# Patient Record
Sex: FEMALE | Race: WHITE
[De-identification: names, ages, dates, MRNs, and addresses within clinical notes are randomized per-mention and may not be internally consistent; named-entity substitution may affect disease eponyms.]

---

## 2017-06-06 ENCOUNTER — HOSPITAL ENCOUNTER (EMERGENCY)
Dept: HOSPITAL 68 - ERH | Age: 37
End: 2017-06-06
Payer: COMMERCIAL

## 2017-06-06 VITALS — WEIGHT: 110 LBS | HEIGHT: 65 IN | BODY MASS INDEX: 18.33 KG/M2

## 2017-06-06 VITALS — SYSTOLIC BLOOD PRESSURE: 98 MMHG | DIASTOLIC BLOOD PRESSURE: 60 MMHG

## 2017-06-06 DIAGNOSIS — F13.239: Primary | ICD-10-CM

## 2017-06-06 NOTE — ED GENERAL ADULT
History of Present Illness
 
General
Chief Complaint: General Adult
Stated Complaint: MED REFILLS, ANXIETY
Source: patient
Exam Limitations: no limitations
 
Vital Signs & Intake/Output
Vital Signs & Intake/Output
 Vital Signs
 
 
Date Time Temp Pulse Resp B/P B/P Pulse O2 O2 Flow FiO2
 
     Mean Ox Delivery Rate 
 
06/06 0725 97.8 78 16 98/60  97   
 
06/06 0722      97   
 
 
Allergies
Coded Allergies:
Sulfa (Sulfonamide Antibiotics) (ANAPHYLAXIS 12/05/16)
venom-honey bee (ANAPHYLAXIS 12/05/16)
 
Reconcile Medications
Alprazolam (Xanax) 0.5 MG TABLET   1 TAB PO BIDP PRN ANXIETY
Alprazolam (Xanax) 0.5 MG TABLET   1 TAB PO BIDP PRN ANXIETY
Beta-Carotene (Beta Carotene) (Unknown Strength) CAPSULE   (Unknown Dose) PO 
DAILY SUPPLEMENT  (Reported)
Cyclobenzaprine HCl 10 MG TABLET   1 TAB PO TID PRN MUSCLE RELAXANT
     MAY CAUSE DROWSINESS
Diazepam 2 MG TABLET   1-2 TAB PO Q6 PRN muscle relaxant
Epinephrine (Epipen 2-Gilberto) 0.3 MG/0.3 ML AUTO.INJCT   0.3 MG IM AD PRN ALLERGIC 
REACTION  (Reported)
Meloxicam (Mobic) 15 MG TABLET   1 TAB PO DAILY PRN PAIN/INFLAMMATION
Tramadol HCl 50 MG TABLET   1-2 TAB PO Q6 PRN pain
 
Triage Note:
PT STATES SHE TOOK HERSELF OFF METHADONE BECAUSE
 SHE WAS ON IT FOR A FEW YEARS.  PT ALSO TAKES
 XANAX QHS TO HELP HER SLEEP STATES THAT SHE HAS
 BEEN TAKING MORE TO HELP HER THROUGH THE
 WITHDRAWELS FROM THE METHADONE AND RUN OUT.  PT
 WENT TO SEE DR. HEAD AND HE WAS UNABLE TO GET
 HER MEDS TO GO THROUGH AT THE PHARMACY BECAUSE IT
 IS TO EARLY FOR HER TO FILL.  PT STATES SHE IS
 ABLE TO P/UP HER PRESCRIPTION ON THURSDAY AND JUST
 NEEDS A FEW PILLS TO HELP HER THROUGH.  PT REPORTS
 THAT SHE IS HAVING DIARRHEA AND NAUSEA AT THIS
 TIME HAS NOT HAD XANAX IN 5 DAYS.
Triage Nurses Notes Reviewed? yes
Pregnant: No
Patient currently breastfeeds: No
HPI:
This is a 36 year old female who recently came off methadone with an presents 
with shakiness, nausea and diarrhea.  her last ast dose of xanax was 5 days ago.
 She is due for refill on thursday.  She has been taking occasional extra doses 
secondary to anxiety because she took herself off of methadone.  She discussed 
it with her primary care doctor who states he could refill the medication 
because her new prescription is due on the 11th.  She has not had Xanax in 5 
days and is starting to feel shaky.  History of seizure many years ago when she 
was on 2 mg of Xanax and attempted to discontinue.  No SI or HI.  She reports 
wanting to eventually get off of Xanax as well.  The patient is 7 months 
postpartum.
 
Past History
 
Travel History
Traveled to [place] past 21 day No
 
Medical History
Any Pertinent Medical History? see below for history
Psychiatric: anxiety, HISTORY OF OPIATE DEPENDENCE
Other Medical Hx:
left shoulder surgery
 
Surgical History
Surgical History: left shoulder surgery
 
Psychosocial History
What is your primary language English
Tobacco Use: Current Daily Use
Daily Tobacco Use Amount/Type: => 5 Cigarettes daily
ETOH Use: occasional use
Illicit Drug Use: denies illicit drug use
 
Family History
Hx Contributory? No
 
Review of Systems
 
Review of Systems
Constitutional:
Denies: chills, fever. 
Respiratory:
Denies: cough, short of breath, sputum production. 
Cardiovascular:
Denies: chest pain, palpitations. 
Neurological/Psychological:
Reports: anxiety.  Denies: confusion, depressed, emotional problems. 
Hematologic/Endocrine:
Denies: bruising, bleeding. 
Immunologic/Allergic:
Denies: splenectomy. 
 
Physical Exam
 
Physical Exam
General Appearance: alert, awake, thin
Head: atraumatic, normal appearance
Eyes:
Bilateral: PERRL, EOMI. 
Ears, Nose, Throat: normal pharynx, hearing grossly normal
Neck: normal inspection, supple, full range of motion
Respiratory: normal breath sounds, chest non-tender, no respiratory distress
Neurologic/Psych: no motor/sensory deficits, awake, alert, oriented x 3
Skin: intact, normal color, warm/dry
 
Core Measures
ACS in differential dx? No
CVA/TIA Diagnosis: No
Severe Sepsis Present: No
Septic Shock Present: No
 
Progress
Differential Diagnoses
I considered the following diagnoses in my evaluation of the patient: [Xanax 
dependence, Xanax withdrawal]
 
Plan of Care:
Patient is well kempt, awake alert and oriented.  Requesting a prescription for 
a few pills of Xanax to get her through Thursday.  Last dose was on Friday.
Initial ED EKG: none
 
Departure
 
Departure
Time of Disposition: 0734
Disposition: HOME OR SELF CARE
Condition: Stable
Clinical Impression
Primary Impression: Benzodiazepine withdrawal
Referrals:
PATIENT HAS NO PRIMARY CARE DR (PCP/Family)
 
Additional Instructions:
TAKE THE MEDICATIONS AS DIRECTED.  FOLLOW UP WITH YOUR PSYCHIATRIST FOR YOUR 
REGULAR PRESCRIPTIONS.
Departure Forms:
Customer Survey
General Discharge Information
Prescriptions:
Current Visit Scripts
Alprazolam (Xanax) 1 TAB PO BIDP PRN ANXIETY 
     #5 TAB 
 
Alprazolam (Xanax) 1 TAB PO BIDP PRN ANXIETY 
     #5 TAB 
 
 
 
Critical Care Note
 
Critical Care Note
Critical Care Time: non-applicable

## 2017-06-07 ENCOUNTER — HOSPITAL ENCOUNTER (EMERGENCY)
Dept: HOSPITAL 68 - ERH | Age: 37
End: 2017-06-07
Payer: COMMERCIAL

## 2017-06-07 VITALS — HEIGHT: 63 IN | BODY MASS INDEX: 20.55 KG/M2 | WEIGHT: 116 LBS

## 2017-06-07 VITALS — SYSTOLIC BLOOD PRESSURE: 90 MMHG | DIASTOLIC BLOOD PRESSURE: 60 MMHG

## 2017-06-07 DIAGNOSIS — F13.20: Primary | ICD-10-CM

## 2017-06-07 NOTE — ED GENERAL ADULT
History of Present Illness
 
General
Chief Complaint: ETOH/Drug Related Complaint
Stated Complaint: BENZO WITHDRAWAL
Source: patient, old records
Exam Limitations: no limitations
 
Vital Signs & Intake/Output
Vital Signs & Intake/Output
 Vital Signs
 
 
Date Time Temp Pulse Resp B/P B/P Pulse O2 O2 Flow FiO2
 
     Mean Ox Delivery Rate 
 
06/07 0710 98.1 100 20 90/60  98 Room Air  
 
 
Allergies
Coded Allergies:
Sulfa (Sulfonamide Antibiotics) (ANAPHYLAXIS 06/07/17)
venom-honey bee (ANAPHYLAXIS 06/07/17)
 
Reconcile Medications
Alprazolam (Xanax) 0.5 MG TABLET   1 TAB PO BIDP PRN ANXIETY
Alprazolam (Xanax) 0.5 MG TABLET   1 TAB PO BIDP PRN ANXIETY
Beta-Carotene (Beta Carotene) (Unknown Strength) CAPSULE   (Unknown Dose) PO 
DAILY SUPPLEMENT  (Reported)
Chlordiazepoxide HCl 25 MG CAPSULE   1 TAB PO TIDPRN PRN anxiety
     1-2 tabs 3 times a day for 2 days
     1-2 tabs 2 times a day for 2 days
     1-2 tabs 1 time a day for 2 days
Cyclobenzaprine HCl 10 MG TABLET   1 TAB PO TID PRN MUSCLE RELAXANT
     MAY CAUSE DROWSINESS
Diazepam 2 MG TABLET   1-2 TAB PO Q6 PRN muscle relaxant
Epinephrine (Epipen 2-Gilberto) 0.3 MG/0.3 ML AUTO.INJCT   0.3 MG IM AD PRN ALLERGIC 
REACTION  (Reported)
Meloxicam (Mobic) 15 MG TABLET   1 TAB PO DAILY PRN PAIN/INFLAMMATION
Ondansetron (Zofran Odt) 4 MG TAB.RAPDIS   1 TAB SL TID PRN nausea
Tramadol HCl 50 MG TABLET   1-2 TAB PO Q6 PRN pain
 
Triage Note:
TRIAGE: PATIENT TO ER FROM HOME REPORTS SEEN HERE
 ON 06/06/17 BY MD JJ, ARRIVES W/ D/C PAPERWORK
 FROM PREVIOUS VISIT FOR BENZO W/D. PATIENT REPORTS
 "WENT TO FILL THE RX BUT IT'S TOO SOON. CAN'T FILL
 IT UNTIL THURSDAY." PATIENT STATES "CAN YOU DO
 ANYTHING FOR ME BEFORE THEN BECAUSE I FEEL VERY
 NERVOUS AND HAVBE A HEADACHE AND FEEL NAUSEOUS AND
 FEEL LIKE, WHAT IF I HAVE A SEIZURE!?" PATIENT
 REPORTS LAST BENZO TAKEN 6-7 DAYS AGO, ALSO
 REPORTS RECENTLY OFF [facility].
 
 PATIENT FIDGITY AND ANXIOUS THROUGHOUT TRIAGE.
Triage Nurses Notes Reviewed? yes
Onset: reports 4 days
Duration: day(s):, constant, continues in ED
Timing: recent history
Severity: severe
No Modifying Factors: none
LMP (ages 10-50): unknown
Pregnant: No
Patient currently breastfeeds: No
HPI:
Patient reports no Xanax for 4 days feeling shaky jittery anxious with insomnia 
anorexia nausea.
She denies fever chills vomiting diarrhea chest pain cough shortness of breath 
headache dysuria rash bleeding pregnancy.
 
 
 
Past History
 
Travel History
Traveled to [place] past 21 day No
 
Medical History
Any Pertinent Medical History? see below for history
Neurological: NONE
EENT: NONE
Cardiovascular: NONE
Respiratory: NONE
Gastrointestinal: NONE
Hepatic: NONE
Renal: NONE
Musculoskeletal: NONE
Psychiatric: anxiety, insomnia, opioid dependence
Endocrine: NONE
Blood Disorders: NONE
Cancer(s): NONE
GYN/Reproductive: NONE
Other Medical Hx:
left shoulder surgery
 
Surgical History
Surgical History: left shoulder surgery
 
Psychosocial History
What is your primary language English
Tobacco Use: Current Daily Use
Daily Tobacco Use Amount/Type: => 5 Cigarettes daily
 
Family History
Hx Contributory? No
 
Review of Systems
 
Review of Systems
Constitutional:
Reports: see HPI, chills. 
EENTM:
Reports: no symptoms. 
Respiratory:
Reports: no symptoms. 
Cardiovascular:
Reports: no symptoms. 
GI:
Reports: see HPI, nausea. 
Genitourinary:
Reports: no symptoms. 
Musculoskeletal:
Reports: no symptoms. 
Skin:
Reports: no symptoms. 
Neurological/Psychological:
Reports: see HPI, anxiety. 
Hematologic/Endocrine:
Reports: no symptoms. 
Immunologic/Allergic:
Reports: no symptoms. 
All Other Systems: Reviewed and Negative
 
Physical Exam
 
Physical Exam
General Appearance: well developed/nourished, alert, awake, anxious, mild 
distress, thin
Head: atraumatic, normal appearance
Eyes:
Bilateral: normal appearance, PERRL, EOMI. 
Ears, Nose, Throat: normal pharynx, normal ENT inspection
Neck: normal inspection, supple, full range of motion, no midline tenderness
Respiratory: normal breath sounds, chest non-tender, no respiratory distress, 
quiet respiration, lungs clear
Cardiovascular: regular rate/rhythm, normal peripheral pulses, norml femoral 
pulses equa
Peripheral Pulses:
4+ carotid (R), 4+ carotid (L)
Gastrointestinal: normal bowel sounds, soft, non-tender, no organomegaly
Back: normal inspection, normal range of motion
Extremities: normal inspection, normal capillary refill, normal range of motion,
no edema
Neurologic/Psych: no motor/sensory deficits, awake, alert, oriented x 3, normal 
gait, CNs II-XII nml as tested
Reflexes:
2+: bicep (R), bicep (L). 
Skin: intact, normal color, warm/dry
Lymphatic: no anterior cervical vincent
 
Core Measures
ACS in differential dx? No
CVA/TIA Diagnosis: No
Severe Sepsis Present: No
Septic Shock Present: No
 
Progress
Differential Diagnoses
I considered the following diagnoses in my evaluation of the patient: 
Benzodiazepine withdrawal abuse
 
Plan of Care:
librium zofran
Initial ED EKG: none
 
Departure
 
Departure
Time of Disposition: 0734
Disposition: HOME OR SELF CARE
Condition: Stable
Clinical Impression
Primary Impression: Benzodiazepine dependence
Referrals:
MANDA RUIZ MD
 
Departure Forms:
General Discharge Information
Prescriptions:
Current Visit Scripts
Chlordiazepoxide HCl 1 TAB PO TIDPRN PRN anxiety 
     #5 CAP 
     1-2 tabs 3 times a day for 2 days
     1-2 tabs 2 times a day for 2 days
     1-2 tabs 1 time a day for 2 days
 
Ondansetron (Zofran Odt) 1 TAB SL TID PRN nausea 
     #5 TAB 
 
 
 
Critical Care Note
 
Critical Care Note
Critical Care Time: non-applicable

## 2018-05-17 ENCOUNTER — HOSPITAL ENCOUNTER (EMERGENCY)
Dept: HOSPITAL 68 - ERH | Age: 38
End: 2018-05-17
Payer: COMMERCIAL

## 2018-05-17 VITALS — HEIGHT: 63 IN | BODY MASS INDEX: 21.26 KG/M2 | WEIGHT: 120 LBS

## 2018-05-17 VITALS — SYSTOLIC BLOOD PRESSURE: 142 MMHG | DIASTOLIC BLOOD PRESSURE: 94 MMHG

## 2018-05-17 DIAGNOSIS — N12: Primary | ICD-10-CM

## 2018-05-17 LAB
ABSOLUTE GRANULOCYTE CT: 7 /CUMM (ref 1.4–6.5)
BASOPHILS # BLD: 0.1 /CUMM (ref 0–0.2)
BASOPHILS NFR BLD: 0.7 % (ref 0–2)
EOSINOPHIL # BLD: 0.4 /CUMM (ref 0–0.7)
EOSINOPHIL NFR BLD: 3.2 % (ref 0–5)
ERYTHROCYTE [DISTWIDTH] IN BLOOD BY AUTOMATED COUNT: 13.8 % (ref 11.5–14.5)
GRANULOCYTES NFR BLD: 63.3 % (ref 42.2–75.2)
HCT VFR BLD CALC: 41.7 % (ref 37–47)
LYMPHOCYTES # BLD: 3.3 /CUMM (ref 1.2–3.4)
MCH RBC QN AUTO: 31.2 PG (ref 27–31)
MCHC RBC AUTO-ENTMCNC: 34.1 G/DL (ref 33–37)
MCV RBC AUTO: 91.4 FL (ref 81–99)
MONOCYTES # BLD: 0.4 /CUMM (ref 0.1–0.6)
PLATELET # BLD: 332 /CUMM (ref 130–400)
PMV BLD AUTO: 7.8 FL (ref 7.4–10.4)
RED BLOOD CELL CT: 4.57 /CUMM (ref 4.2–5.4)
WBC # BLD AUTO: 11.1 /CUMM (ref 4.8–10.8)

## 2018-05-17 NOTE — ED GI/GU/ABDOMINAL COMPLAINT
History of Present Illness
 
General
Chief Complaint: Female Urogenital Problems
Stated Complaint: "I THINK I HAVE A KIDNEY INFECTION"
Source: patient
Exam Limitations: no limitations
 
Vital Signs & Intake/Output
Vital Signs & Intake/Output
 Vital Signs
 
 
Date Time Temp Pulse Resp B/P B/P Pulse O2 O2 Flow FiO2
 
     Mean Ox Delivery Rate 
 
05/17 1458 97.5 114 18 142/94  99 Room Air  
 
 
 
Allergies
Coded Allergies:
Sulfa (Sulfonamide Antibiotics) (ANAPHYLAXIS 06/07/17)
codeine (NAUSEA 03/29/18)
venom-honey bee (ANAPHYLAXIS 06/07/17)
 
Reconcile Medications
Alprazolam (Xanax) 0.5 MG TABLET   1 TAB PO BIDP PRN ANXIETY
Alprazolam (Xanax) 0.5 MG TABLET   1 TAB PO BIDP PRN ANXIETY
Chlordiazepoxide HCl 25 MG CAPSULE   1 TAB PO TIDPRN PRN anxiety
     1-2 tabs 3 times a day for 2 days
     1-2 tabs 2 times a day for 2 days
     1-2 tabs 1 time a day for 2 days
Ciprofloxacin HCl (Cipro) 500 MG TABLET   1 TAB PO BID uti
Clindamycin HCl (Cleocin HCl) 150 MG CAPSULE   1 CAP PO TID dental infection
Epinephrine (Epipen 2-Gilberto) 0.3 MG/0.3 ML AUTO.INJCT   0.3 MG IM AD PRN ALLERGIC 
REACTION  (Reported)
Ondansetron (Zofran Odt) 4 MG TAB.RAPDIS   1 TAB SL TID PRN nausea
Oxycodone HCl/Acetaminophen (Percocet 5-325 MG Tablet) 5 MG-325 MG TABLET   1-2 
TAB PO Q6P PRN PAIN
Oxycodone HCl/Acetaminophen (Percocet 5-325 MG Tablet) 5 MG-325 MG TABLET   1 
TAB PO BID PRN pain
 
Triage Note:
PT TO ER C/C UTI MONDAY, NOW HAS SEVERE LEFT FLANK
PAIN, BODY ACHES, CHILLS. PER PT, "I JUST
CRANBERRY JUICE TO GET RID OF MY UTI". URGENCY AND
DYSURIA PERSISTS
Triage Nurses Notes Reviewed? yes
Pregnant? n
Is pt currently breastfeeding? No
Onset: Gradual
Duration: day(s):
Timing: recent history
Quality/Severity: moderate
Location: left flank
HPI:
37-year-old female presents emergency department complaining of dysuria, urinary
frequency, left flank pain x 4 days.  Patient has a history of previous UTI.  4 
days ago she began experiencing symptoms consistent with previous UTIs.  Patient
was experiencing dysuria, suprapubic discomfort, urinary frequency, hematuria.  
Patient recurrent juice however her symptoms did not go away.  Over the last 2 
days she has been experiencing left flank pain, fevers, chills, body aches, 
malaise.  Flank pain is described as nonradiating, dull aching pain. Patient 
also reports 2 episodes of nonbloody diarrhea recently.  Patient has no previous
history of pyelonephritis or kidney stone.  The patient is sexually active with 
her .  Patient denies vomiting, constipation, rash.
(Renetta Orellana)
 
Past History
 
Travel History
Traveled to [place] past 21 day No
 
Medical History
Any Pertinent Medical History? see below for history
Neurological: NONE
EENT: NONE
Cardiovascular: NONE
Respiratory: NONE
Gastrointestinal: NONE
Hepatic: NONE
Renal: NONE
Musculoskeletal: NONE
Psychiatric: anxiety, insomnia, opioid dependence
Endocrine: NONE
Blood Disorders: NONE
Cancer(s): NONE
GYN/Reproductive: NONE
Other Medical Hx:
left shoulder surgery
 
Surgical History
Surgical History: left shoulder surgery
 
Psychosocial History
What is your primary language English
Tobacco Use: Current Daily Use
Daily Tobacco Use Amount/Type: => 5 Cigarettes daily
 
Family History
Hx Contributory? No
(Renetta Orellana)
 
Review of Systems
 
Review of Systems
Constitutional:
Reports: see HPI. 
EENTM:
Reports: no symptoms. 
Respiratory:
Reports: no symptoms. 
Cardiovascular:
Reports: no symptoms. 
GI:
Reports: see HPI. 
Genitourinary:
Reports: see HPI. 
Musculoskeletal:
Reports: no symptoms. 
Skin:
Reports: no symptoms. 
Neurological/Psychological:
Reports: no symptoms. 
Hematologic/Endocrine:
Reports: no symptoms. 
Immunologic/Allergic:
Reports: no symptoms. 
All Other Systems: Reviewed and Negative
(Renetta Orlelana)
 
Physical Exam
 
Physical Exam
General Appearance: well developed/nourished, no apparent distress, alert, awake
Head: atraumatic, normal appearance
Eyes:
Bilateral: normal appearance. 
Ears, Nose, Throat, Mouth: hearing grossly normal
Neck: normal inspection, supple, full range of motion
Respiratory: normal breath sounds, no respiratory distress, lungs clear
Cardiovascular: regular rate/rhythm
Gastrointestinal: normal bowel sounds, soft, non-tender, no organomegaly
Back: left CVA tenderness
Extremities: normal range of motion
Neurologic/Psych: awake, alert, oriented x 3
Skin: intact, normal color, warm/dry
 
Core Measures
ACS in differential dx? No
Sepsis Present: No
Sepsis Focused Exam Completed? No
(Jessica BOWERS,Renetta Teran)
 
Progress
Differential Diagnosis: appendicitis, intrauterine pregnancy, kidney stone, 
ovarian cyst, PID/cervicitis, UTI/pyelo
Plan of Care:
 Orders
 
 
Procedure Date/time Status
 
Add-on Test (ER Only) 05/17 1721 Active
 
URINE PREGNANCY 05/17 1446 Complete
 
URINALYSIS 05/17 1446 Complete
 
COMPREHENSIVE METABOLIC PANEL 05/17 1446 Complete
 
CBC WITHOUT DIFFERENTIAL 05/17 1446 Complete
 
 
 Laboratory Tests
 
 
05/17/18 1535:
Anion Gap 15, Estimated GFR > 60, BUN/Creatinine Ratio 10.0, Glucose 101  H, 
Calcium 10.6  H, Total Bilirubin 0.4, AST 15, ALT 22, Alkaline Phosphatase 42, 
Total Protein 7.8, Albumin 5.1  H, Globulin 2.7, Albumin/Globulin Ratio 1.9, CBC
w Diff NO MAN DIFF REQ, RBC 4.57, MCV 91.4, MCH 31.2  H, MCHC 34.1, RDW 13.8, 
MPV 7.8, Gran % 63.3, Lymphocytes % 29.4, Monocytes % 3.4, Eosinophils % 3.2, 
Basophils % 0.7, Absolute Granulocytes 7.0  H, Absolute Lymphocytes 3.3, 
Absolute Monocytes 0.4, Absolute Eosinophils 0.4, Absolute Basophils 0.1
 
05/17/18 1502:
Urine Color YEL, Urine Clarity CLEAR, Urine pH 6.5, Ur Specific Gravity <= 1.005
, Urine Protein NEG, Urine Ketones NEG, Urine Nitrite NEG, Urine Bilirubin NEG, 
Urine Urobilinogen 0.2, Ur Leukocyte Esterase SMALL  H, Ur Microscopic SEDIMENT 
EXAMINED, Urine RBC 3-5, Urine WBC 15-25  H, Ur Epithelial Cells MOD  H, Urine 
Bacteria MOD  H, Urine Hemoglobin TRACE-INTACT, Urine Glucose NEG, Urine 
Pregnancy Test NEGATIVE
 
UA shows evidence for urine infection.  Given patient's left-sided CVA 
tenderness she likely has pyelonephritis.  Patient has no abdominal tenderness 
on physical exam.  Her blood work shows mild leukocytosis, otherwise blood work 
is within normal limits.  Patient is young and otherwise healthy.  Patient 
states that she's been waiting in the waiting room for 3 hours, she would just 
like to go home now.  Based on patient's current symptoms is low suspicion for 
acute appendicitis at this time, CT imaging deferred given her most likely 
diagnosis is pyelonephritis.  Low suspicion for obstructing kidney stone given 
patient's dural/mild flank pain, minimal blood in urine. Patient in no acute 
distress, nontoxic appearing, vital signs are stable.  The patient was given 
strict return precautions.  She was started on Cipro antibiotics and culture is 
pending.  Patient instructed to increase oral fluid intake.  Patient informed 
that at times visuals require hospitalization for pyelonephritis and she 
understands red flag symptoms and when to return.  The patient agrees with the 
plan of care. 
Initial ED EKG: none
(Jessica BOWERS,Renetta Teran)
 
Departure
 
Departure
Disposition: HOME OR SELF CARE
Condition: Stable
Clinical Impression
Primary Impression: Pyelonephritis
Referrals:
Patient Has No Primary Care Dr (PCP/Family)
 
Additional Instructions:
Take full course of antibiotics.  Take Percocet as prescribed as needed for 
pain.  You may also take this medication with 600 mg ibuprofen and an additional
325 mg Tylenol for fevers and pain.  If your symptoms are worsening or you're 
having other concerns please return to the emergency department.  We will call 
you if the results of your culture are abnormal.
 
Please note that there might be incidental findings in your evaluation that are 
unrelated to the current emergency department visit.  Please notify your primary
care doctor about this emergency department visit in order to obtain and review 
all of the testing performed so that these incidental findings can be monitored 
as needed.
 
If you had an x-ray performed, please understand that some fractures may not be 
seen on the initial set of x-rays.  If your symptoms persist you might need a 
repeat set of x-rays to check for such a fracture.
 
If you had a laceration evaluated, please understand that foreign bodies such as
glass or wood may not be visible to the naked eye or on plain x-rays.  If the 
wound becomes red, swollen, increasingly more painful or if there is any 
drainage from the wound, please have it reevaluated by a physician for the 
possibility of a retained foreign body.
 
If you're unable to follow up as outlined in the discharge instructions please 
return to the emergency department.
 
Thank you for choosing the [facility] Emergency Department for your care.
It was a pleasure to serve you today.
Departure Forms:
Customer Survey
General Discharge Information
Prescriptions:
Current Visit Scripts
Ciprofloxacin HCl (Cipro) 1 TAB PO BID  
     #20 TAB 
 
Oxycodone HCl/Acetaminophen (Percocet 5-325 MG Tablet) 1 TAB PO BID PRN pain 
     #10 TAB 
 
 
(Jessica BOWERS,Renetta Teran)
 
PA/NP Co-Sign Statement
Statement:
ED Attending supervision documentation-
 
[] I saw and evaluated the patient. I have also reviewed all the pertinent lab 
results and diagnostic results. I agree with the findings and the plan of care 
as documented in the PA's/NP's documentation. 
 
[X] I have reviewed the ED Record and agree with the PA's/NP's documentation.
 
[] Additions or exceptions (if any) to the PAs/NP's note and plan are 
summarized below:
[]
 
(Ibrahima ACEVES,Ted DOUGHERTY)

## 2018-08-13 ENCOUNTER — HOSPITAL ENCOUNTER (EMERGENCY)
Dept: HOSPITAL 68 - ERH | Age: 38
End: 2018-08-13
Payer: COMMERCIAL

## 2018-08-13 VITALS — BODY MASS INDEX: 20.91 KG/M2 | HEIGHT: 63 IN | WEIGHT: 118 LBS

## 2018-08-13 VITALS — SYSTOLIC BLOOD PRESSURE: 103 MMHG | DIASTOLIC BLOOD PRESSURE: 71 MMHG

## 2018-08-13 DIAGNOSIS — R07.81: ICD-10-CM
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
## 2018-08-13 DIAGNOSIS — S29.9XXA: Primary | ICD-10-CM
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
## 2018-08-13 NOTE — ED GENERAL ADULT
History of Present Illness
 
General
Chief Complaint: Trunk Injury
Stated Complaint: PT THINKS BOKE SOME RIBS ON LEFT SIDE
Source: patient
Exam Limitations: no limitations
 
Vital Signs & Intake/Output
Vital Signs & Intake/Output
 Vital Signs
 
 
Date Time Temp Pulse Resp B/P B/P Pulse O2 O2 Flow FiO2
 
     Mean Ox Delivery Rate 
 
08/13 0531      99 Room Air  
 
08/13 0531  72  103/71  99 Room Air  
 
08/13 0429 98.7 88 18 99/61  99 Room Air  
 
 
 
Allergies
Coded Allergies:
Sulfa (Sulfonamide Antibiotics) (ANAPHYLAXIS 05/31/18)
codeine (NAUSEA 05/31/18)
venom-honey bee (ANAPHYLAXIS 05/31/18)
naproxen (STOMACH DISCOMFORT 05/31/18)
 
Reconcile Medications
Albuterol Sulfate (Proair Hfa) 90 MCG HFA.AER.AD   2 PUF INH Q4-6 PRN PRN 
WHEEZING,TROUBLE BREATHING
Azithromycin (Zithromax) 250 MG TABLET   1 DP PO AD BRONCHITIS
     2 the first day followed by 1 for days 2-5
Codeine Phosphate/Guaifenesi (Cheratussin AC Syrup) 10 MG-100 MG/5 ML LIQUID   5
-10 ML PO Q6P PRN COUGH
 
Triage Nurses Notes Reviewed? yes
Onset: Abrupt
Duration: minute(s):
Timing: single episode today
HPI:
37 year old female presents to the emergency department with left sided rib pain
after breaking here sons fall at the playground today at 1:30.  P.m.  She 
complains of tenderness and pain with any type of movement. She denies abdominal
pain. She does have scant ecchymosis to the left upper chest wall, it appears 
faint.  Approximately the size of a quarter.
 
Past History
 
Travel History
Traveled to [place] past 21 day No
 
Medical History
Any Pertinent Medical History? see below for history
Neurological: NONE
EENT: NONE
Cardiovascular: NONE
Respiratory: NONE
Gastrointestinal: NONE, Acid Reflux
Hepatic: NONE
Renal: NONE
Musculoskeletal: NONE
Psychiatric: anxiety, insomnia, opioid dependence
Endocrine: NONE
Blood Disorders: NONE
Cancer(s): NONE
GYN/Reproductive: NONE
Other Medical Hx:
left shoulder surgery
 
Surgical History
Surgical History: left shoulder surgery
 
Psychosocial History
What is your primary language English
 
Family History
Hx Contributory? No
 
Review of Systems
 
Review of Systems
Constitutional:
Reports: see HPI.  Denies: fever. 
EENTM:
Reports: no symptoms.  Denies: blurred vision, double vision, visual changes. 
Respiratory:
Reports: see HPI. 
Cardiovascular:
Reports: no symptoms.  Denies: chest pain, palpitations. 
GI:
Reports: no symptoms.  Denies: abdominal pain. 
Genitourinary:
Reports: no symptoms. 
Musculoskeletal:
Reports: see HPI. 
Skin:
Reports: no symptoms. 
Neurological/Psychological:
Reports: no symptoms. 
Hematologic/Endocrine:
Reports: see HPI.  Denies: bleeding. 
Immunologic/Allergic:
Reports: no symptoms. 
All Other Systems: Reviewed and Negative
 
Physical Exam
 
Physical Exam
General Appearance: well developed/nourished, alert, awake, mild distress
Head: atraumatic, normal appearance
Eyes:
Bilateral: normal appearance, PERRL, EOMI. 
Ears, Nose, Throat: normal ENT inspection
Neck: normal inspection, full range of motion
Respiratory: normal breath sounds, no respiratory distress
Cardiovascular: regular rate/rhythm
Peripheral Pulses:
4+ radial (R), 4+ radial (L)
Gastrointestinal: soft, non-tender
Back: normal inspection
Extremities: normal inspection, normal range of motion
Neurologic/Psych: no motor/sensory deficits, awake, alert, oriented x 3
Skin: ecchymosis (left axillary area)
Comments:
No tenderness to deep palpation of the left upper quadrant.
 
Core Measures
ACS in differential dx? No
CVA/TIA Diagnosis: No
Sepsis Present: No
Sepsis Focused Exam Completed? No
 
Progress
Differential Diagnoses
I considered the following diagnoses in my evaluation of the patient: [
Pneumothorax, rib fracture, spleen injury, substance abuse]
 
Plan of Care:
 Orders
 
 
Procedure Date/time Status
 
Add-on Test (ER Only) 08/13 0530 Active
 
URINE DRUGS OF ABUSE 08/13 0440 Complete
 
URINE PREGNANCY 08/13 0425 Complete
 
 
 Laboratory Tests
 
 
 
08/13/18 0440:
Urine Opiates Screen < 100, Methadone Screen 52, Barbiturate Screen 82, Ur 
Phencyclidine Scrn < 6.00, Amphetamines Screen 123, U Benzodiazepines Scrn > 800
 H, Urine Cocaine Screen > 1000  H, Urine Cannabis Screen > 80.00  H, Urine 
Pregnancy Test NEGATIVE
 
Initial ED EKG: none
 
Departure
 
Departure
Disposition: STILL A PATIENT
Condition: Stable
Clinical Impression
Primary Impression: Rib injury
Referrals:
Patient Has No Primary Care Dr (PCP/Family)
 
Departure Forms:
Customer Survey
General Discharge Information
Comments
 
 
 
 
 
5:40 AM
Upon reevaluation
The patient is sleepy but wakes to verbal stimuli.  She is trying to arrange for
a ride home.  Abdomen is again soft and nontender,  she has no tenderness to the
left upper quadrant.
x-rays are negative.
 
 
 
The patient requested additional medications for pain.  Urine drug screen was 
concerning for substance abuse.  The patient was advised to take Tylenol or 
Advil for the rib pain and to follow-up with her doctor this week.  She is 
ambulating with a steady gait.  She called her  for a ride home.
 
Critical Care Note
 
Critical Care Note
Critical Care Time: non-applicable

## 2018-08-13 NOTE — RADIOLOGY REPORT
EXAMINATION:
XR RIBS, LEFT
 
CLINICAL INFORMATION:
Left rib pain
 
COMPARISON:
None
 
TECHNIQUE:
2 views of the left ribs were obtained. PA view of the chest
 
FINDINGS:
Lungs are clear. No consolidation, pneumothorax, or pleural effusion. The
cardiomediastinal silhouette and pulmonary vasculature are normal.
 
Osseous structures are unremarkable. Ribs are intact. No fractures are
identified.
 
IMPRESSION:
Clear lungs. No focal rib abnormality.

## 2018-09-21 ENCOUNTER — HOSPITAL ENCOUNTER (EMERGENCY)
Dept: HOSPITAL 68 - ERH | Age: 38
End: 2018-09-21
Payer: COMMERCIAL

## 2018-09-21 VITALS — SYSTOLIC BLOOD PRESSURE: 120 MMHG | DIASTOLIC BLOOD PRESSURE: 82 MMHG

## 2018-09-21 VITALS — BODY MASS INDEX: 22.32 KG/M2 | WEIGHT: 126 LBS | HEIGHT: 63 IN

## 2018-09-21 DIAGNOSIS — K08.89: Primary | ICD-10-CM

## 2018-09-21 NOTE — ED THROAT/DENTAL COMPLAINT
History of Present Illness
 
General
Chief Complaint: Sore Throat, Dental Pain
Stated Complaint: DENTAL PAIN
Source: patient
Exam Limitations: no limitations
 
Vital Signs & Intake/Output
Vital Signs & Intake/Output
 Vital Signs
 
 
Date Time Temp Pulse Resp B/P B/P Pulse O2 O2 Flow FiO2
 
     Mean Ox Delivery Rate 
 
09/21 0959 98.5 118 18 121/87  99 Room Air  
 
 
 
Allergies
Coded Allergies:
Sulfa (Sulfonamide Antibiotics) (ANAPHYLAXIS 05/31/18)
codeine (NAUSEA 05/31/18)
venom-honey bee (ANAPHYLAXIS 05/31/18)
naproxen (STOMACH DISCOMFORT 05/31/18)
 
Reconcile Medications
Albuterol Sulfate (Proair Hfa) 90 MCG HFA.AER.AD   2 PUF INH Q4-6 PRN PRN 
WHEEZING,TROUBLE BREATHING
Amoxicillin/Potassium Clav (Augmentin 875-125 Tablet) 875 MG-125 MG TABLET   1 
TAB PO BID DENTAL INFECTION
Azithromycin (Zithromax) 250 MG TABLET   1 DP PO AD BRONCHITIS
     2 the first day followed by 1 for days 2-5
Codeine Phosphate/Guaifenesi (Cheratussin AC Syrup) 10 MG-100 MG/5 ML LIQUID   5
-10 ML PO Q6P PRN COUGH
Hydrocodone/Acetaminophen (Norco 5-325 Tablet) 5 MG-325 MG TABLET   1-2 TAB PO 
Q4-6 PRN PRN SEVERE PAIN
 
Triage Note:
38F REPORTS SHE HAD AN ABSCESS TO BOTTOM LEFT
TOOTH/GUM LINE, INTERMITTENT PAIN AND EXACERBATION
OF PAIN. REPORTS BLEEDING AND YELLOW PURULENT
DRAINAGE RECENTLY. NOT CURRENTLY ON ANTIBIOTICS.
WAS REFERRED TO AN ORAL SURGEON BUT SHE CANNOT GET
ENOUGH OF THE COST COVERED FOR SURGICAL
INTERVENTION. AFEBRILE. TOOK MOTRIN 600MG 5:30 AND
ALEVE AT 6:30AM. MEDICATED WITH TYLENOL IN TRIAGE
Triage Nurses Notes Reviewed? yes
Onset: Abrupt
Duration: week(s): (3), constant, continues in ED, getting worse
Timing: recent history
Injury Environment: home
Severity: moderate, severe
Severity Numbers: 8
No Modifying Factors: none
Modifying Factors:
Worsens With: movement. 
Pregnant: No
Patient currently breastfeeds: No
HPI:
38-year-old female history of anxiety, chronic dental pain presents for 
evaluation of worsening pain in her teeth.  Patient reports for the past several
weeks she has had pain and swelling and discharge coming from her left lower 
gums and molars.  She reports she saw a dentist a couple weeks ago and was told 
that she needed a surgical procedure and was referred to a surgeon.  She states 
she was unable to afford the cost which was several thousand dollars.  She 
reports she has been saving up and is in the process of scheduling it but 
reports that the pain is continuing to get worse.  Currently she is taking Aleve
and ibuprofen without any relief.  She is not on antibiotics.  She is able 
tolerate fluids no fevers no difficulty swallowing difficulty breathing.  She is
not a diabetic.
(Brady Bardales)
 
Past History
 
Travel History
Traveled to [place] past 21 day No
 
Medical History
Any Pertinent Medical History? see below for history
Neurological: NONE
EENT: NONE
Cardiovascular: NONE
Respiratory: NONE
Gastrointestinal: NONE, Acid Reflux
Hepatic: NONE
Renal: NONE
Musculoskeletal: NONE
Psychiatric: anxiety, insomnia, opioid dependence
Endocrine: NONE
Blood Disorders: NONE
Cancer(s): NONE
GYN/Reproductive: NONE
Other Medical Hx:
left shoulder surgery
 
Surgical History
Surgical History: left shoulder surgery
 
Psychosocial History
What is your primary language English
Tobacco Use: Current Daily Use
Daily Tobacco Use Amount/Type: => 5 Cigarettes daily
ETOH Use: denies use
Illicit Drug Use: denies illicit drug use
 
Family History
Hx Contributory? No
(Brady Bardales)
 
Review of Systems
 
Review of Systems
Constitutional:
Reports: no symptoms. 
EENTM:
Reports: mouth pain, tooth pain. 
Respiratory:
Reports: no symptoms. 
Cardiovascular:
Reports: no symptoms. 
GI:
Reports: no symptoms. 
Genitourinary:
Reports: no symptoms. 
Musculoskeletal:
Reports: no symptoms. 
Skin:
Reports: no symptoms. 
Neurological/Psychological:
Reports: no symptoms. 
Hematologic/Endocrine:
Reports: no symptoms. 
Immunologic/Allergic:
Reports: no symptoms. 
All Other Systems: Reviewed and Negative
(Brady Bardales)
 
Physical Exam
 
Physical Exam
General Appearance: well developed/nourished, no apparent distress, alert, awake
Head: atraumatic, normal appearance
Eyes:
Bilateral: normal appearance, EOMI. 
Ears:
Bilateral: canal normal, Tympanic normal. 
Nose: normal inspection
Mouth/Throat: very poor dentition overall multiple dental carries multiple 
missing riding cracked teeth.  There is tenderness to palpation of the left 
lower molars.  No point tenderness no focal fluctuant areas no erythema and no 
discharge no swelling.  No lymphadenopathy no erythema over the buccal mucosa or
gingiva.  No trismus patient is handling secretions noted tonsillar swelling or 
exudate
Neck: normal inspection, supple, full range of motion
Cardiovascular/Respiratory: normal breath sounds, normal peripheral pulses, 
regular rate/rhythm, no respiratory distress
Back: normal inspection, normal range of motion
Neurologic/Psych: no motor/sensory deficits, awake, alert, oriented x 3, normal 
gait, normal mood/affect
Skin: intact, normal color, warm/dry
 
Core Measures
ACS in differential dx? No
Sepsis Present: No
Sepsis Focused Exam Completed? No
(Brady Bardales)
 
Progress
Differential Diagnosis: aspirated tooth, carious tooth, epiglottitis, Ludwigs 
angina, odontogenic abscess, dung-tonsillar abscess, pharyngeal for. body, 
stomatitis/gingivitis, tooth fracture
Plan of Care:
Patient is here with acute on chronic dental pain.  She is in the process of 
getting a surgery but has not yet scheduled it due to insurance/financial 
issues.  On exam she has tenderness to the left lower teeth without evidence of 
abscess trismus or significant infection.  There is no erythema and no swelling 
no discharge no focal fluctuant areas.  Patient will be covered with Augmentin. 
Advised increasing fluids rest apply ice medical mouthwash.  Tylenol ibuprofen 
Norco for pain.  Follow-up with dental surgeon as soon as possible discussed 
return precautions patient agrees
(Brady Bardales)
 
Departure
 
Departure
Disposition: HOME OR SELF CARE
Condition: Stable
Clinical Impression
Primary Impression: Chronic dental pain
Referrals:
Patient Has No Primary Care Dr (PCP/Family)
 
Additional Instructions:
ANTIBIOTICS AS DIRETCED FOR FULL COURSE. TYLENOL/IBUPROFEN FOR PAIN. NOROC FOR 
SEVERE PAIN ONLY. YOU NEED TO BE SEEN BY A DENTIST ASAP. RETURN WITH FEVER, 
WORSENING PAIN, UNABLE TO SWALLOW FLUIDS OR ANY OTHER CONCENRS. 
Departure Forms:
Customer Survey
General Discharge Information
Prescriptions:
Current Visit Scripts
Amoxicillin/Potassium Clav (Augmentin 875-125 Tablet) 1 TAB PO BID  
     #20 TAB 
 
Hydrocodone/Acetaminophen (Norco 5-325 Tablet) 1-2 TAB PO Q4-6 PRN PRN SEVERE 
PAIN 
     #10 TAB 
 
 
(Brady Bardales)
 
PA/NP Co-Sign Statement
Statement:
ED Attending supervision documentation-
 
 I saw and evaluated the patient. I have also reviewed all the pertinent lab 
results and diagnostic results. I agree with the findings and the plan of care 
as documented in the PA's/NP's documentation. 
 
x I have reviewed the ED Record and agree with the PA's/NP's documentation.
 
[] Additions or exceptions (if any) to the PAs/NP's note and plan are 
summarized below:
[]
 
(Gabriel ACEVES,[place])